# Patient Record
Sex: MALE | Race: WHITE | Employment: FULL TIME | URBAN - METROPOLITAN AREA
[De-identification: names, ages, dates, MRNs, and addresses within clinical notes are randomized per-mention and may not be internally consistent; named-entity substitution may affect disease eponyms.]

---

## 2019-05-08 ENCOUNTER — HOSPITAL ENCOUNTER (EMERGENCY)
Age: 49
Discharge: HOME | End: 2019-05-09
Attending: EMERGENCY MEDICINE
Payer: COMMERCIAL

## 2019-05-08 ENCOUNTER — APPOINTMENT (OUTPATIENT)
Dept: GENERAL RADIOLOGY | Age: 49
End: 2019-05-08
Attending: EMERGENCY MEDICINE
Payer: COMMERCIAL

## 2019-05-08 DIAGNOSIS — R06.6 INTRACTABLE HICCUPS: Primary | ICD-10-CM

## 2019-05-08 LAB
ALBUMIN SERPL-MCNC: 3.7 G/DL (ref 3.5–5)
ALBUMIN/GLOB SERPL: 0.9 {RATIO} (ref 1.2–3.5)
ALP SERPL-CCNC: 88 U/L (ref 50–136)
ALT SERPL-CCNC: 50 U/L (ref 12–65)
ANION GAP SERPL CALC-SCNC: 6 MMOL/L (ref 7–16)
AST SERPL-CCNC: 20 U/L (ref 15–37)
BASOPHILS # BLD: 0 K/UL (ref 0–0.2)
BASOPHILS NFR BLD: 0 % (ref 0–2)
BILIRUB SERPL-MCNC: 0.4 MG/DL (ref 0.2–1.1)
BUN SERPL-MCNC: 16 MG/DL (ref 6–23)
CALCIUM SERPL-MCNC: 9 MG/DL (ref 8.3–10.4)
CHLORIDE SERPL-SCNC: 104 MMOL/L (ref 98–107)
CO2 SERPL-SCNC: 30 MMOL/L (ref 21–32)
CREAT SERPL-MCNC: 1.09 MG/DL (ref 0.8–1.5)
DIFFERENTIAL METHOD BLD: NORMAL
EOSINOPHIL # BLD: 0.1 K/UL (ref 0–0.8)
EOSINOPHIL NFR BLD: 1 % (ref 0.5–7.8)
ERYTHROCYTE [DISTWIDTH] IN BLOOD BY AUTOMATED COUNT: 12.4 % (ref 11.9–14.6)
GLOBULIN SER CALC-MCNC: 4.1 G/DL (ref 2.3–3.5)
GLUCOSE SERPL-MCNC: 88 MG/DL (ref 65–100)
HCT VFR BLD AUTO: 45.2 % (ref 41.1–50.3)
HGB BLD-MCNC: 15.7 G/DL (ref 13.6–17.2)
IMM GRANULOCYTES # BLD AUTO: 0 K/UL (ref 0–0.5)
IMM GRANULOCYTES NFR BLD AUTO: 0 % (ref 0–5)
LIPASE SERPL-CCNC: 159 U/L (ref 73–393)
LYMPHOCYTES # BLD: 3.2 K/UL (ref 0.5–4.6)
LYMPHOCYTES NFR BLD: 44 % (ref 13–44)
MCH RBC QN AUTO: 30.4 PG (ref 26.1–32.9)
MCHC RBC AUTO-ENTMCNC: 34.7 G/DL (ref 31.4–35)
MCV RBC AUTO: 87.4 FL (ref 79.6–97.8)
MONOCYTES # BLD: 0.5 K/UL (ref 0.1–1.3)
MONOCYTES NFR BLD: 7 % (ref 4–12)
NEUTS SEG # BLD: 3.5 K/UL (ref 1.7–8.2)
NEUTS SEG NFR BLD: 47 % (ref 43–78)
NRBC # BLD: 0 K/UL (ref 0–0.2)
PLATELET # BLD AUTO: 209 K/UL (ref 150–450)
PMV BLD AUTO: 9.7 FL (ref 9.4–12.3)
POTASSIUM SERPL-SCNC: 4.3 MMOL/L (ref 3.5–5.1)
PROT SERPL-MCNC: 7.8 G/DL (ref 6.3–8.2)
RBC # BLD AUTO: 5.17 M/UL (ref 4.23–5.6)
SODIUM SERPL-SCNC: 140 MMOL/L (ref 136–145)
TROPONIN I SERPL-MCNC: <0.02 NG/ML (ref 0.02–0.05)
WBC # BLD AUTO: 7.3 K/UL (ref 4.3–11.1)

## 2019-05-08 PROCEDURE — 99284 EMERGENCY DEPT VISIT MOD MDM: CPT | Performed by: EMERGENCY MEDICINE

## 2019-05-08 PROCEDURE — 80053 COMPREHEN METABOLIC PANEL: CPT

## 2019-05-08 PROCEDURE — 93005 ELECTROCARDIOGRAM TRACING: CPT | Performed by: EMERGENCY MEDICINE

## 2019-05-08 PROCEDURE — 96375 TX/PRO/DX INJ NEW DRUG ADDON: CPT | Performed by: EMERGENCY MEDICINE

## 2019-05-08 PROCEDURE — 74011250636 HC RX REV CODE- 250/636: Performed by: EMERGENCY MEDICINE

## 2019-05-08 PROCEDURE — 83690 ASSAY OF LIPASE: CPT

## 2019-05-08 PROCEDURE — 71046 X-RAY EXAM CHEST 2 VIEWS: CPT

## 2019-05-08 PROCEDURE — 84484 ASSAY OF TROPONIN QUANT: CPT

## 2019-05-08 PROCEDURE — 85025 COMPLETE CBC W/AUTO DIFF WBC: CPT

## 2019-05-08 RX ORDER — MORPHINE SULFATE 4 MG/ML
4 INJECTION INTRAVENOUS
Status: COMPLETED | OUTPATIENT
Start: 2019-05-08 | End: 2019-05-08

## 2019-05-08 RX ADMIN — SODIUM CHLORIDE 1000 ML: 900 INJECTION, SOLUTION INTRAVENOUS at 23:39

## 2019-05-08 RX ADMIN — MORPHINE SULFATE 4 MG: 4 INJECTION INTRAVENOUS at 23:39

## 2019-05-09 VITALS
TEMPERATURE: 98 F | BODY MASS INDEX: 25.67 KG/M2 | RESPIRATION RATE: 16 BRPM | HEART RATE: 67 BPM | WEIGHT: 200 LBS | DIASTOLIC BLOOD PRESSURE: 90 MMHG | SYSTOLIC BLOOD PRESSURE: 145 MMHG | OXYGEN SATURATION: 95 % | HEIGHT: 74 IN

## 2019-05-09 LAB
ATRIAL RATE: 76 BPM
CALCULATED P AXIS, ECG09: 62 DEGREES
CALCULATED R AXIS, ECG10: 82 DEGREES
CALCULATED T AXIS, ECG11: 55 DEGREES
DIAGNOSIS, 93000: NORMAL
P-R INTERVAL, ECG05: 138 MS
Q-T INTERVAL, ECG07: 402 MS
QRS DURATION, ECG06: 138 MS
QTC CALCULATION (BEZET), ECG08: 452 MS
VENTRICULAR RATE, ECG03: 76 BPM

## 2019-05-09 PROCEDURE — 96365 THER/PROPH/DIAG IV INF INIT: CPT | Performed by: EMERGENCY MEDICINE

## 2019-05-09 PROCEDURE — 74011250636 HC RX REV CODE- 250/636: Performed by: EMERGENCY MEDICINE

## 2019-05-09 PROCEDURE — 74011000258 HC RX REV CODE- 258: Performed by: EMERGENCY MEDICINE

## 2019-05-09 RX ORDER — CHLORPROMAZINE HYDROCHLORIDE 10 MG/1
10 TABLET, FILM COATED ORAL 3 TIMES DAILY
Qty: 30 TAB | Refills: 0 | Status: SHIPPED | OUTPATIENT
Start: 2019-05-09 | End: 2020-02-03

## 2019-05-09 RX ADMIN — CHLORPROMAZINE HYDROCHLORIDE 25 MG: 25 INJECTION INTRAMUSCULAR at 00:01

## 2019-05-09 NOTE — ED TRIAGE NOTES
Reports right upper back pain RUQ abdominal pain and hiccups. Reports hiccups constant since onset Saturday. States onset of RUQ and upper back pain onset last Wednesday, states pain presented same as hx of ulcers. Reports being treated for \"peptic ulcers\" of which states is currently taking omeprazole, clarithromycin and amoxicillin. Reports diarrhea, onset after beginning meds saturaday. Denies chest pain or shortness of breath. Denies n/v/d. Reports non-productive cough. Denies smoking.  Diaphoretic on arrival

## 2019-05-09 NOTE — ED NOTES
I have reviewed discharge instructions with the patient. The patient verbalized understanding. Patient left ED via Discharge Method: ambulatory to Home with mother. Opportunity for questions and clarification provided. Patient given 1 scripts. To continue your aftercare when you leave the hospital, you may receive an automated call from our care team to check in on how you are doing. This is a free service and part of our promise to provide the best care and service to meet your aftercare needs.  If you have questions, or wish to unsubscribe from this service please call [phone]. Thank you for Choosing our [facility] Emergency Department.

## 2019-05-09 NOTE — DISCHARGE INSTRUCTIONS
Patient Education        Hiccups: Care Instructions  Your Care Instructions    Hiccups occur when a spasm contracts the diaphragm, a large sheet of muscle that separates the chest cavity from the abdominal cavity. The spasm causes an intake of breath that is suddenly stopped by the closure of the vocal cords (glottis). This closure causes the \"hiccup\" sound. A very full stomach can cause hiccups that go away on their own. A full stomach can be caused by things like eating too much food too quickly or swallowing too much air. Most hiccups go away on their own within a few minutes to a few hours and do not require any treatment. Hiccups that last longer than 48 hours are called persistent hiccups. Hiccups that last longer than a month are called intractable hiccups. Both persistent and intractable hiccups may be a sign of a more serious health problem. Follow-up care is a key part of your treatment and safety. Be sure to make and go to all appointments, and call your doctor if you are having problems. It's also a good idea to know your test results and keep a list of the medicines you take. How can you care for yourself at home? · Try these safe and easy home remedies if your hiccups are making you uncomfortable. ? Eat a teaspoon of sugar or honey. ? Hold your breath and count slowly to 10.  ? Quickly drink a glass of cold water. · If your doctor prescribed medicine, take it as directed. Call your doctor if you think you are having a problem with your medicine. To help prevent hiccups  · Take steps to avoid swallowing air:  ? Eat slowly. Avoid gulping food or beverages. ? Chew your food thoroughly before you swallow. ? Avoid drinking through a straw. ? Avoid chewing gum or eating hard candy. ? Do not smoke or use other tobacco products. ? If you wear dentures, check with a dentist to make sure they fit properly. · Do not eat large meals. · Do not drink alcohol.   · Avoid sudden changes in stomach temperature, such as drinking a hot beverage and then a cold beverage. · Avoid emotional stress or excitement. When should you call for help? Call your doctor now or seek immediate medical care if:    · You have trouble swallowing and are unable to swallow food or fluids.     · You have hiccups for more than 2 days.     · You have new symptoms, such as belly pain, constipation, diarrhea, heartburn, or vomiting.    Watch closely for changes in your health, and be sure to contact your doctor if:    · You have trouble swallowing but are able to swallow food and fluids.     · Hiccups occur often and get in the way of your activities.     · You think medicine may be causing your symptoms.     · You do not get better as expected. Where can you learn more? Go to http://rehana-ana maria.info/. Enter U824 in the search box to learn more about \"Hiccups: Care Instructions. \"  Current as of: September 23, 2018  Content Version: 11.9  © 6603-0755 [employer]. Care instructions adapted under license by [employer] (which disclaims liability or warranty for this information). If you have questions about a medical condition or this instruction, always ask your healthcare professional. [address] any warranty or liability for your use of this information.

## 2019-05-09 NOTE — ED PROVIDER NOTES
Patient currently being treated for an ulcer. States she's had hiccups for the past 5 days. Constant in nature. Slight relief with lying down but still present. His pain seems to be in the right upper quadrant achy in nature. Some relief with Pepto-Bismol today. Denies any chest pain or shortness of breath. No nausea or vomiting. The history is provided by the patient. No  was used. Hiccups This is a new problem. Episode onset: 5 days ago. The problem occurs constantly. The problem has not changed since onset. The pain is associated with an unknown factor. The pain is located in the RUQ. The quality of the pain is aching. The pain is mild. Pertinent negatives include no fever, no diarrhea, no melena, no nausea, no vomiting, no constipation, no dysuria, no hematuria, no headaches, no chest pain and no back pain. The pain is worsened by eating. The pain is relieved by nothing. Past Medical History:  
Diagnosis Date  Hypertension No past surgical history on file. Family History:  
Problem Relation Age of Onset  Heart Disease Mother  Hypertension Mother  Heart Disease Father  Hypertension Father Social History Socioeconomic History  Marital status: SINGLE Spouse name: Not on file  Number of children: Not on file  Years of education: Not on file  Highest education level: Not on file Occupational History  Not on file Social Needs  Financial resource strain: Not on file  Food insecurity:  
  Worry: Not on file Inability: Not on file  Transportation needs:  
  Medical: Not on file Non-medical: Not on file Tobacco Use  Smoking status: Never Smoker  Smokeless tobacco: Never Used Substance and Sexual Activity  Alcohol use: Yes  Drug use: Not on file  Sexual activity: Not on file Lifestyle  Physical activity:  
  Days per week: Not on file Minutes per session: Not on file  Stress: Not on file Relationships  Social connections:  
  Talks on phone: Not on file Gets together: Not on file Attends Denominational service: Not on file Active member of club or organization: Not on file Attends meetings of clubs or organizations: Not on file Relationship status: Not on file  Intimate partner violence:  
  Fear of current or ex partner: Not on file Emotionally abused: Not on file Physically abused: Not on file Forced sexual activity: Not on file Other Topics Concern  Not on file Social History Narrative  Not on file ALLERGIES: Patient has no known allergies. Review of Systems Constitutional: Negative for chills and fever. HENT: Negative for rhinorrhea and sore throat. Eyes: Negative for pain and redness. Respiratory: Negative for chest tightness, shortness of breath and wheezing. Cardiovascular: Negative for chest pain and leg swelling. Gastrointestinal: Negative for abdominal pain, constipation, diarrhea, melena, nausea and vomiting. Genitourinary: Negative for dysuria and hematuria. Musculoskeletal: Negative for back pain, gait problem, neck pain and neck stiffness. Skin: Negative for color change and rash. Neurological: Negative for weakness, numbness and headaches. Vitals:  
 05/08/19 2033 05/08/19 2315 BP: (!) 169/99 (!) 172/99 Pulse: 78 Resp: 18 Temp: 98 °F (36.7 °C) SpO2: 99% 99% Weight: 90.7 kg (200 lb) Height: 6' 2\" (1.88 m) Physical Exam  
Constitutional: He is oriented to person, place, and time. He appears well-developed and well-nourished. HENT:  
Head: Normocephalic and atraumatic. Neck: Normal range of motion. Neck supple. Cardiovascular: Normal rate and regular rhythm. Pulmonary/Chest: Effort normal and breath sounds normal.  
Abdominal: Soft. Bowel sounds are normal. There is no tenderness. Musculoskeletal: Normal range of motion. He exhibits no edema. Neurological: He is alert and oriented to person, place, and time. Skin: Skin is warm and dry. MDM Number of Diagnoses or Management Options Diagnosis management comments: Thorazine improved take up slightly. We'll continue her home and have patient follow-up with GI doctor. Amount and/or Complexity of Data Reviewed Clinical lab tests: ordered and reviewed Tests in the radiology section of CPT®: ordered and reviewed Tests in the medicine section of CPT®: ordered and reviewed Patient Progress Patient progress: stable Procedures EKG: normal sinus rhythm, nonspecific ST and T waves changes, RBBB. Rate 76. CXR no acute. Results Include: 
 
Recent Results (from the past 24 hour(s)) EKG, 12 LEAD, INITIAL Collection Time: 05/08/19  8:30 PM  
Result Value Ref Range Ventricular Rate 76 BPM  
 Atrial Rate 76 BPM  
 P-R Interval 138 ms QRS Duration 138 ms Q-T Interval 402 ms QTC Calculation (Bezet) 452 ms Calculated P Axis 62 degrees Calculated R Axis 82 degrees Calculated T Axis 55 degrees Diagnosis    
  !!! Poor data quality, interpretation may be adversely affected Normal sinus rhythm Right bundle branch block Abnormal ECG No previous ECGs available CBC WITH AUTOMATED DIFF Collection Time: 05/08/19  8:38 PM  
Result Value Ref Range WBC 7.3 4.3 - 11.1 K/uL  
 RBC 5.17 4.23 - 5.6 M/uL  
 HGB 15.7 13.6 - 17.2 g/dL HCT 45.2 41.1 - 50.3 % MCV 87.4 79.6 - 97.8 FL  
 MCH 30.4 26.1 - 32.9 PG  
 MCHC 34.7 31.4 - 35.0 g/dL  
 RDW 12.4 11.9 - 14.6 % PLATELET 069 707 - 530 K/uL MPV 9.7 9.4 - 12.3 FL ABSOLUTE NRBC 0.00 0.0 - 0.2 K/uL  
 DF AUTOMATED NEUTROPHILS 47 43 - 78 % LYMPHOCYTES 44 13 - 44 % MONOCYTES 7 4.0 - 12.0 % EOSINOPHILS 1 0.5 - 7.8 % BASOPHILS 0 0.0 - 2.0 % IMMATURE GRANULOCYTES 0 0.0 - 5.0 %  
 ABS. NEUTROPHILS 3.5 1.7 - 8.2 K/UL  
 ABS. LYMPHOCYTES 3.2 0.5 - 4.6 K/UL ABS. MONOCYTES 0.5 0.1 - 1.3 K/UL  
 ABS. EOSINOPHILS 0.1 0.0 - 0.8 K/UL  
 ABS. BASOPHILS 0.0 0.0 - 0.2 K/UL  
 ABS. IMM. GRANS. 0.0 0.0 - 0.5 K/UL METABOLIC PANEL, COMPREHENSIVE Collection Time: 05/08/19  8:38 PM  
Result Value Ref Range Sodium 140 136 - 145 mmol/L Potassium 4.3 3.5 - 5.1 mmol/L Chloride 104 98 - 107 mmol/L  
 CO2 30 21 - 32 mmol/L Anion gap 6 (L) 7 - 16 mmol/L Glucose 88 65 - 100 mg/dL BUN 16 6 - 23 MG/DL Creatinine 1.09 0.8 - 1.5 MG/DL  
 GFR est AA >60 >60 ml/min/1.73m2 GFR est non-AA >60 >60 ml/min/1.73m2 Calcium 9.0 8.3 - 10.4 MG/DL Bilirubin, total 0.4 0.2 - 1.1 MG/DL  
 ALT (SGPT) 50 12 - 65 U/L  
 AST (SGOT) 20 15 - 37 U/L Alk. phosphatase 88 50 - 136 U/L Protein, total 7.8 6.3 - 8.2 g/dL Albumin 3.7 3.5 - 5.0 g/dL Globulin 4.1 (H) 2.3 - 3.5 g/dL A-G Ratio 0.9 (L) 1.2 - 3.5 LIPASE Collection Time: 05/08/19  8:38 PM  
Result Value Ref Range Lipase 159 73 - 393 U/L  
TROPONIN I Collection Time: 05/08/19  8:38 PM  
Result Value Ref Range  Troponin-I, Qt. <0.02 (L) 0.02 - 0.05 NG/ML

## 2020-02-03 PROBLEM — R07.89 OTHER CHEST PAIN: Status: ACTIVE | Noted: 2020-02-03

## 2020-02-03 PROBLEM — I45.10 RBBB: Status: ACTIVE | Noted: 2020-02-03
